# Patient Record
Sex: MALE | Race: WHITE | NOT HISPANIC OR LATINO | Employment: FULL TIME | URBAN - METROPOLITAN AREA
[De-identification: names, ages, dates, MRNs, and addresses within clinical notes are randomized per-mention and may not be internally consistent; named-entity substitution may affect disease eponyms.]

---

## 2021-04-07 ENCOUNTER — WORKER'S COMP (OUTPATIENT)
Dept: URGENT CARE | Age: 26
End: 2021-04-07

## 2021-04-07 VITALS
SYSTOLIC BLOOD PRESSURE: 110 MMHG | HEART RATE: 72 BPM | DIASTOLIC BLOOD PRESSURE: 78 MMHG | RESPIRATION RATE: 16 BRPM | TEMPERATURE: 98.2 F
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
## 2021-04-07 DIAGNOSIS — S61.215A LACERATION OF LEFT RING FINGER WITHOUT FOREIGN BODY WITHOUT DAMAGE TO NAIL, INITIAL ENCOUNTER: ICD-10-CM

## 2021-04-07 PROCEDURE — 12001 RPR S/N/AX/GEN/TRNK 2.5CM/<: CPT | Performed by: FAMILY MEDICINE

## 2021-04-07 PROCEDURE — 99213 OFFICE O/P EST LOW 20 MIN: CPT | Performed by: FAMILY MEDICINE

## 2021-04-07 PROCEDURE — 90715 TDAP VACCINE 7 YRS/> IM: CPT | Performed by: FAMILY MEDICINE

## 2021-04-07 PROCEDURE — 90471 IMMUNIZATION ADMIN: CPT | Performed by: FAMILY MEDICINE

## 2021-04-07 RX ORDER — LORAZEPAM 1 MG/1
TABLET ORAL
COMMUNITY
Start: 2021-02-03

## 2021-04-07 RX ORDER — ESCITALOPRAM OXALATE 10 MG/1
10 TABLET ORAL DAILY
COMMUNITY
Start: 2021-02-03

## 2021-04-09 ENCOUNTER — CASE MANAGEMENT (OUTPATIENT)
Dept: OCCUPATIONAL MEDICINE | Age: 26
End: 2021-04-09

## 2022-03-03 ENCOUNTER — TELEPHONE (OUTPATIENT)
Dept: SCHEDULING | Facility: IMAGING CENTER | Age: 27
End: 2022-03-03

## 2022-03-13 NOTE — PROGRESS NOTES
Subjective:     CC:  Diagnoses of Dizziness, Anxiety, Family history of diabetes mellitus (DM), Need for hepatitis C screening test, Screening for HIV (human immunodeficiency virus), and Encounter to establish care with new doctor were pertinent to this visit.    HISTORY OF THE PRESENT ILLNESS: Patient is a 26 y.o. male. This pleasant patient is here today to establish care and discuss chronic conditions. His prior PCP was none.    Problem   Dizziness    Chronic condition. He reports intermittent dizziness described as wobbling and shortness of breath usually when he is stressed. His symptoms are sometimes worsened by head movement. He drinks about 80cc a day. He wakes up slightly anxious. No known history of snoring. No recent blood work. Job is stressful.     Anxiety    Chronic condition. His main concern is that he sometimes slurs while talking when he is anxious and he is hoping to improve that so he can handle advancement in work positions. Has tried Zoloft and Lexapro in the past for 4-5 months and stopped due to feeling sleepy. His previous provider in [place] had him try lorazepam 0.5mg as needed which helped with sleep but he felt it was too strong. He knows he has a good life but will sometimes   Current regimen: none  He reports compliance and/or tolerance and symptoms controlled with current medication(s). Does not need medication(s) refill. No acute concerns.     Family History of Diabetes Mellitus (Dm)    He reports DM in maternal grandfather.         Current Outpatient Medications Ordered in Epic   Medication Sig Dispense Refill   • LORazepam (ATIVAN) 0.5 MG Tab Take 0.5 mg by mouth every four hours as needed for Anxiety.     • hydrOXYzine HCl (ATARAX) 25 MG Tab Take 1 Tablet by mouth 3 times a day as needed for Anxiety. 60 Tablet 3   • sertraline (ZOLOFT) 50 MG Tab Take 1 Tablet by mouth every morning. 30 Tablet 2   • scopolamine (TRANSDERM-SCOP, 1.5 MG,) 1 mg/72hr PATCH 72 HR Place 1 Patch on the  "skin every 72 hours. 4 Patch 3   • albuterol 108 (90 Base) MCG/ACT Aero Soln inhalation aerosol Inhale 1-2 Puffs every four hours as needed for Shortness of Breath. 1 Each 3     No current Epic-ordered facility-administered medications on file.     Social history  Living situation: lives by self at home, he was last in [place] and moved to [place] 07/2021, originally from [place]  Occupation: works as  at [employer]  Alcohol/tobacco/illicit drugs: never smoker, social EtOH, denies illicit drugs  Diet/Exercise: tries to eat healthy in general, regular exercise 4 times a week with weight lifting    Health Maintenance: reviewed and discussed with patient  Vaccines: due for flu, Tdap, HPV, Moderna COVID #2 received 08/2021    ROS:   Gen: no fevers/chills, no changes in weight  Eyes: no changes in vision  ENT: no sore throat  Pulm: + sob, no cough  CV: no chest pain, no palpitations  GI: no nausea/vomiting, no diarrhea  : no dysuria  MSk: no myalgias  Skin: no rash  Neuro: no headaches, + dizziness  Psych: + anxiety      Objective:     Exam: /68 (BP Location: Left arm, Patient Position: Sitting, BP Cuff Size: Adult)   Pulse 78   Temp 36.6 °C (97.8 °F) (Temporal)   Resp 14   Ht 1.753 m (5' 9\")   Wt 69.2 kg (152 lb 8.9 oz)   SpO2 96%  Body mass index is 22.53 kg/m².    General: Normal appearing. No distress.  HEENT: Normocephalic.  Neck: Supple without JVD or bruit. Thyroid is not enlarged.  Pulmonary: Clear to ausculation. Normal effort. No rales, ronchi, or wheezing.  Cardiovascular: Regular rate and rhythm without murmur.  Abdomen: Soft, nontender, nondistended. Normal bowel sounds.  Neurologic: Grossly nonfocal  Skin: Warm and dry. No obvious lesions.  Musculoskeletal: Normal gait. No extremity cyanosis, clubbing, or edema.  Psych: Normal mood and affect. Alert and oriented x3. Judgment and insight is normal.    Labs: No recent lab results available for review at this time    GAD7 score " 14    Assessment & Plan:   26 y.o. male with the following -    1. Dizziness  Chronic condition, persistent. Possibly BPPV vs anxiety related symptoms. Plan to trial scopolamine patch. Follow up blood work per orders.  - CBC WITH DIFFERENTIAL; Future  - Comp Metabolic Panel; Future  - scopolamine (TRANSDERM-SCOP, 1.5 MG,) 1 mg/72hr PATCH 72 HR; Place 1 Patch on the skin every 72 hours.  Dispense: 4 Patch; Refill: 3    2. Anxiety  Chronic condition, persistent. Patient would like to retry SSRI at this time.  - restart sertraline 50mg qAM, Rx hydroxyzine 25mg as needed for anxiety  - Referral to Psychology  - hydrOXYzine HCl (ATARAX) 25 MG Tab; Take 1 Tablet by mouth 3 times a day as needed for Anxiety.  Dispense: 60 Tablet; Refill: 3  - sertraline (ZOLOFT) 50 MG Tab; Take 1 Tablet by mouth every morning.  Dispense: 30 Tablet; Refill: 2  - TSH WITH REFLEX TO FT4; Future  - albuterol 108 (90 Base) MCG/ACT Aero Soln inhalation aerosol; Inhale 1-2 Puffs every four hours as needed for Shortness of Breath.  Dispense: 1 Each; Refill: 3    3. Family history of diabetes mellitus (DM)  - HEMOGLOBIN A1C; Future    4. Need for hepatitis C screening test  - HEP C VIRUS ANTIBODY; Future    5. Screening for HIV (human immunodeficiency virus)  - HIV AG/AB COMBO ASSAY SCREENING; Future    6. Encounter to establish care with new doctor        Return in about 6 weeks (around 4/29/2022) for f/u anxiety.    Please note that this dictation was created using voice recognition software. I have made every reasonable attempt to correct obvious errors, but I expect that there are errors of grammar and possibly content that I did not discover before finalizing the note.

## 2022-03-18 ENCOUNTER — HOSPITAL ENCOUNTER (OUTPATIENT)
Dept: LAB | Facility: MEDICAL CENTER | Age: 27
End: 2022-03-18
Attending: STUDENT IN AN ORGANIZED HEALTH CARE EDUCATION/TRAINING PROGRAM
Payer: COMMERCIAL

## 2022-03-18 ENCOUNTER — OFFICE VISIT (OUTPATIENT)
Dept: MEDICAL GROUP | Facility: MEDICAL CENTER | Age: 27
End: 2022-03-18
Payer: COMMERCIAL

## 2022-03-18 VITALS
BODY MASS INDEX: 22.6 KG/M2 | SYSTOLIC BLOOD PRESSURE: 114 MMHG | TEMPERATURE: 97.8 F | DIASTOLIC BLOOD PRESSURE: 68 MMHG | OXYGEN SATURATION: 96 % | HEIGHT: 69 IN | HEART RATE: 78 BPM | WEIGHT: 152.56 LBS | RESPIRATION RATE: 14 BRPM

## 2022-03-18 DIAGNOSIS — Z11.59 NEED FOR HEPATITIS C SCREENING TEST: ICD-10-CM

## 2022-03-18 DIAGNOSIS — Z11.4 SCREENING FOR HIV (HUMAN IMMUNODEFICIENCY VIRUS): ICD-10-CM

## 2022-03-18 DIAGNOSIS — Z83.3 FAMILY HISTORY OF DIABETES MELLITUS (DM): ICD-10-CM

## 2022-03-18 DIAGNOSIS — R42 DIZZINESS: ICD-10-CM

## 2022-03-18 DIAGNOSIS — F41.9 ANXIETY: ICD-10-CM

## 2022-03-18 DIAGNOSIS — Z76.89 ENCOUNTER TO ESTABLISH CARE WITH NEW DOCTOR: ICD-10-CM

## 2022-03-18 LAB
ALBUMIN SERPL BCP-MCNC: 4.8 G/DL (ref 3.2–4.9)
ALBUMIN/GLOB SERPL: 1.9 G/DL
ALP SERPL-CCNC: 57 U/L (ref 30–99)
ALT SERPL-CCNC: 23 U/L (ref 2–50)
ANION GAP SERPL CALC-SCNC: 10 MMOL/L (ref 7–16)
AST SERPL-CCNC: 21 U/L (ref 12–45)
BASOPHILS # BLD AUTO: 0.9 % (ref 0–1.8)
BASOPHILS # BLD: 0.04 K/UL (ref 0–0.12)
BILIRUB SERPL-MCNC: 0.7 MG/DL (ref 0.1–1.5)
BUN SERPL-MCNC: 14 MG/DL (ref 8–22)
CALCIUM SERPL-MCNC: 9.6 MG/DL (ref 8.4–10.2)
CHLORIDE SERPL-SCNC: 105 MMOL/L (ref 96–112)
CO2 SERPL-SCNC: 25 MMOL/L (ref 20–33)
CREAT SERPL-MCNC: 0.89 MG/DL (ref 0.5–1.4)
EOSINOPHIL # BLD AUTO: 0.08 K/UL (ref 0–0.51)
EOSINOPHIL NFR BLD: 1.8 % (ref 0–6.9)
ERYTHROCYTE [DISTWIDTH] IN BLOOD BY AUTOMATED COUNT: 39.6 FL (ref 35.9–50)
FASTING STATUS PATIENT QL REPORTED: NORMAL
GFR SERPLBLD CREATININE-BSD FMLA CKD-EPI: 121 ML/MIN/1.73 M 2
GLOBULIN SER CALC-MCNC: 2.5 G/DL (ref 1.9–3.5)
GLUCOSE SERPL-MCNC: 86 MG/DL (ref 65–99)
HCT VFR BLD AUTO: 48.7 % (ref 42–52)
HGB BLD-MCNC: 16.2 G/DL (ref 14–18)
IMM GRANULOCYTES # BLD AUTO: 0.01 K/UL (ref 0–0.11)
IMM GRANULOCYTES NFR BLD AUTO: 0.2 % (ref 0–0.9)
LYMPHOCYTES # BLD AUTO: 1.59 K/UL (ref 1–4.8)
LYMPHOCYTES NFR BLD: 35.2 % (ref 22–41)
MCH RBC QN AUTO: 29.7 PG (ref 27–33)
MCHC RBC AUTO-ENTMCNC: 33.3 G/DL (ref 33.7–35.3)
MCV RBC AUTO: 89.4 FL (ref 81.4–97.8)
MONOCYTES # BLD AUTO: 0.44 K/UL (ref 0–0.85)
MONOCYTES NFR BLD AUTO: 9.7 % (ref 0–13.4)
NEUTROPHILS # BLD AUTO: 2.36 K/UL (ref 1.82–7.42)
NEUTROPHILS NFR BLD: 52.2 % (ref 44–72)
NRBC # BLD AUTO: 0 K/UL
NRBC BLD-RTO: 0 /100 WBC
PLATELET # BLD AUTO: 147 K/UL (ref 164–446)
PMV BLD AUTO: 11.7 FL (ref 9–12.9)
POTASSIUM SERPL-SCNC: 4 MMOL/L (ref 3.6–5.5)
PROT SERPL-MCNC: 7.3 G/DL (ref 6–8.2)
RBC # BLD AUTO: 5.45 M/UL (ref 4.7–6.1)
SODIUM SERPL-SCNC: 140 MMOL/L (ref 135–145)
TSH SERPL DL<=0.005 MIU/L-ACNC: 2.4 UIU/ML (ref 0.38–5.33)
WBC # BLD AUTO: 4.5 K/UL (ref 4.8–10.8)

## 2022-03-18 PROCEDURE — 83036 HEMOGLOBIN GLYCOSYLATED A1C: CPT

## 2022-03-18 PROCEDURE — 99203 OFFICE O/P NEW LOW 30 MIN: CPT | Performed by: STUDENT IN AN ORGANIZED HEALTH CARE EDUCATION/TRAINING PROGRAM

## 2022-03-18 PROCEDURE — 85025 COMPLETE CBC W/AUTO DIFF WBC: CPT

## 2022-03-18 PROCEDURE — 87389 HIV-1 AG W/HIV-1&-2 AB AG IA: CPT

## 2022-03-18 PROCEDURE — 36415 COLL VENOUS BLD VENIPUNCTURE: CPT

## 2022-03-18 PROCEDURE — 84443 ASSAY THYROID STIM HORMONE: CPT

## 2022-03-18 PROCEDURE — 86803 HEPATITIS C AB TEST: CPT

## 2022-03-18 PROCEDURE — 80053 COMPREHEN METABOLIC PANEL: CPT

## 2022-03-18 RX ORDER — LORAZEPAM 0.5 MG/1
0.5 TABLET ORAL EVERY 4 HOURS PRN
COMMUNITY
End: 2022-04-29

## 2022-03-18 RX ORDER — ALBUTEROL SULFATE 90 UG/1
1-2 AEROSOL, METERED RESPIRATORY (INHALATION) EVERY 4 HOURS PRN
Qty: 1 EACH | Refills: 3 | Status: SHIPPED | OUTPATIENT
Start: 2022-03-18 | End: 2023-03-14

## 2022-03-18 RX ORDER — SCOLOPAMINE TRANSDERMAL SYSTEM 1 MG/1
1 PATCH, EXTENDED RELEASE TRANSDERMAL
Qty: 4 PATCH | Refills: 3 | Status: SHIPPED | OUTPATIENT
Start: 2022-03-18 | End: 2022-04-29 | Stop reason: SDUPTHER

## 2022-03-18 RX ORDER — HYDROXYZINE HYDROCHLORIDE 25 MG/1
25 TABLET, FILM COATED ORAL 3 TIMES DAILY PRN
Qty: 60 TABLET | Refills: 3 | Status: SHIPPED | OUTPATIENT
Start: 2022-03-18 | End: 2022-05-22 | Stop reason: SDUPTHER

## 2022-03-18 ASSESSMENT — ANXIETY QUESTIONNAIRES
IF YOU CHECKED OFF ANY PROBLEMS ON THIS QUESTIONNAIRE, HOW DIFFICULT HAVE THESE PROBLEMS MADE IT FOR YOU TO DO YOUR WORK, TAKE CARE OF THINGS AT HOME, OR GET ALONG WITH OTHER PEOPLE: SOMEWHAT DIFFICULT
2. NOT BEING ABLE TO STOP OR CONTROL WORRYING: MORE THAN HALF THE DAYS
6. BECOMING EASILY ANNOYED OR IRRITABLE: NOT AT ALL
5. BEING SO RESTLESS THAT IT IS HARD TO SIT STILL: MORE THAN HALF THE DAYS
GAD7 TOTAL SCORE: 14
1. FEELING NERVOUS, ANXIOUS, OR ON EDGE: NEARLY EVERY DAY
4. TROUBLE RELAXING: MORE THAN HALF THE DAYS
7. FEELING AFRAID AS IF SOMETHING AWFUL MIGHT HAPPEN: NEARLY EVERY DAY
3. WORRYING TOO MUCH ABOUT DIFFERENT THINGS: MORE THAN HALF THE DAYS

## 2022-03-18 ASSESSMENT — PATIENT HEALTH QUESTIONNAIRE - PHQ9: CLINICAL INTERPRETATION OF PHQ2 SCORE: 0

## 2022-03-19 LAB
EST. AVERAGE GLUCOSE BLD GHB EST-MCNC: 103 MG/DL
HBA1C MFR BLD: 5.2 % (ref 4–5.6)
HCV AB SER QL: NORMAL
HIV 1+2 AB+HIV1 P24 AG SERPL QL IA: NORMAL

## 2022-04-29 ENCOUNTER — OFFICE VISIT (OUTPATIENT)
Dept: MEDICAL GROUP | Facility: MEDICAL CENTER | Age: 27
End: 2022-04-29
Payer: COMMERCIAL

## 2022-04-29 ENCOUNTER — HOSPITAL ENCOUNTER (OUTPATIENT)
Facility: MEDICAL CENTER | Age: 27
End: 2022-04-29
Attending: STUDENT IN AN ORGANIZED HEALTH CARE EDUCATION/TRAINING PROGRAM
Payer: COMMERCIAL

## 2022-04-29 VITALS
WEIGHT: 151.46 LBS | TEMPERATURE: 98.6 F | SYSTOLIC BLOOD PRESSURE: 118 MMHG | RESPIRATION RATE: 16 BRPM | HEART RATE: 81 BPM | DIASTOLIC BLOOD PRESSURE: 60 MMHG | OXYGEN SATURATION: 94 % | HEIGHT: 69 IN | BODY MASS INDEX: 22.43 KG/M2

## 2022-04-29 DIAGNOSIS — R42 DIZZINESS: ICD-10-CM

## 2022-04-29 DIAGNOSIS — F90.0 ATTENTION DEFICIT HYPERACTIVITY DISORDER (ADHD), PREDOMINANTLY INATTENTIVE TYPE: ICD-10-CM

## 2022-04-29 DIAGNOSIS — F41.9 ANXIETY: ICD-10-CM

## 2022-04-29 PROBLEM — F90.9 ADHD: Status: ACTIVE | Noted: 2022-04-29

## 2022-04-29 PROCEDURE — 99214 OFFICE O/P EST MOD 30 MIN: CPT | Performed by: STUDENT IN AN ORGANIZED HEALTH CARE EDUCATION/TRAINING PROGRAM

## 2022-04-29 PROCEDURE — G0481 DRUG TEST DEF 8-14 CLASSES: HCPCS

## 2022-04-29 RX ORDER — SCOLOPAMINE TRANSDERMAL SYSTEM 1 MG/1
1 PATCH, EXTENDED RELEASE TRANSDERMAL
Qty: 4 PATCH | Refills: 3 | Status: SHIPPED | OUTPATIENT
Start: 2022-04-29 | End: 2022-07-29

## 2022-04-29 RX ORDER — DEXTROAMPHETAMINE SACCHARATE, AMPHETAMINE ASPARTATE, DEXTROAMPHETAMINE SULFATE AND AMPHETAMINE SULFATE 1.25; 1.25; 1.25; 1.25 MG/1; MG/1; MG/1; MG/1
5 TABLET ORAL EVERY MORNING
Qty: 30 TABLET | Refills: 0 | Status: SHIPPED | OUTPATIENT
Start: 2022-05-30 | End: 2022-06-29

## 2022-04-29 RX ORDER — DEXTROAMPHETAMINE SACCHARATE, AMPHETAMINE ASPARTATE, DEXTROAMPHETAMINE SULFATE AND AMPHETAMINE SULFATE 1.25; 1.25; 1.25; 1.25 MG/1; MG/1; MG/1; MG/1
5 TABLET ORAL EVERY MORNING
Qty: 30 TABLET | Refills: 0 | Status: SHIPPED | OUTPATIENT
Start: 2022-04-29 | End: 2022-05-29

## 2022-04-29 RX ORDER — DEXTROAMPHETAMINE SACCHARATE, AMPHETAMINE ASPARTATE, DEXTROAMPHETAMINE SULFATE AND AMPHETAMINE SULFATE 1.25; 1.25; 1.25; 1.25 MG/1; MG/1; MG/1; MG/1
5 TABLET ORAL EVERY MORNING
Qty: 30 TABLET | Refills: 0 | Status: SHIPPED | OUTPATIENT
Start: 2022-06-30 | End: 2022-07-29 | Stop reason: SDUPTHER

## 2022-04-29 ASSESSMENT — FIBROSIS 4 INDEX: FIB4 SCORE: 0.74

## 2022-04-30 DIAGNOSIS — F90.0 ATTENTION DEFICIT HYPERACTIVITY DISORDER (ADHD), PREDOMINANTLY INATTENTIVE TYPE: ICD-10-CM

## 2022-05-03 LAB
1OH-MIDAZOLAM UR QL SCN: NOT DETECTED
6MAM UR QL: NOT DETECTED
7AMINOCLONAZEPAM UR QL: NOT DETECTED
A-OH ALPRAZ UR QL: NOT DETECTED
ALPRAZ UR QL: NOT DETECTED
AMPHET UR QL SCN: NOT DETECTED
ANNOTATION COMMENT IMP: NORMAL
ANNOTATION COMMENT IMP: NORMAL
BARBITURATES UR QL: NOT DETECTED
BUPRENORPHINE UR QL: NOT DETECTED
BZE UR QL: NOT DETECTED
CARBOXYTHC UR QL: PRESENT
CARISOPRODOL UR QL: NOT DETECTED
CLONAZEPAM UR QL: NOT DETECTED
CODEINE UR QL: NOT DETECTED
DIAZEPAM UR QL: NOT DETECTED
ETHYL GLUCURONIDE UR QL: NOT DETECTED
FENTANYL UR QL: NOT DETECTED
GABAPENTIN UR QL: NOT DETECTED
HYDROCODONE UR QL: NOT DETECTED
HYDROMORPHONE UR QL: NOT DETECTED
LORAZEPAM UR QL: NOT DETECTED
MDA UR QL: NOT DETECTED
MDEA UR QL: NOT DETECTED
MDMA UR QL: NOT DETECTED
MEPERIDINE UR QL: NOT DETECTED
METHADONE UR QL: NOT DETECTED
METHAMPHET UR QL: NOT DETECTED
MIDAZOLAM UR QL SCN: NOT DETECTED
MORPHINE UR QL: NOT DETECTED
NALOXONE UR QL SCN: NOT DETECTED
NORBUPRENORPHINE UR QL CFM: NOT DETECTED
NORDIAZEPAM UR QL: NOT DETECTED
NORFENTANYL UR QL: NOT DETECTED
NORHYDROCODONE UR QL CFM: NOT DETECTED
NOROXYCODONE UR QL CFM: NOT DETECTED
NOROXYMORPH CO100 Q0458: NOT DETECTED
OXAZEPAM UR QL: NOT DETECTED
OXYCODONE UR QL: NOT DETECTED
OXYMORPHONE UR QL: NOT DETECTED
PATHOLOGY STUDY: NORMAL
PCP UR QL: NOT DETECTED
PHENTERMINE UR QL: NOT DETECTED
PPAA UR QL: NOT DETECTED
PREGABALIN UR QL SCN: NOT DETECTED
SERVICE CMNT-IMP: NORMAL
TAPENADOL OSULF CO200 Q0473: NOT DETECTED
TAPENTADOL UR QL SCN: NOT DETECTED
TEMAZEPAM UR QL: NOT DETECTED
TRAMADOL UR QL: NOT DETECTED
ZOLPIDEM PHENYL-4-CARB UR QL SCN: NOT DETECTED
ZOLPIDEM UR QL: NOT DETECTED

## 2022-05-22 DIAGNOSIS — F41.9 ANXIETY: ICD-10-CM

## 2022-05-23 RX ORDER — HYDROXYZINE HYDROCHLORIDE 25 MG/1
25 TABLET, FILM COATED ORAL 3 TIMES DAILY PRN
Qty: 60 TABLET | Refills: 3 | Status: SHIPPED | OUTPATIENT
Start: 2022-05-23 | End: 2022-07-29 | Stop reason: SDUPTHER

## 2022-07-29 ENCOUNTER — OFFICE VISIT (OUTPATIENT)
Dept: MEDICAL GROUP | Facility: MEDICAL CENTER | Age: 27
End: 2022-07-29
Payer: COMMERCIAL

## 2022-07-29 VITALS
OXYGEN SATURATION: 95 % | DIASTOLIC BLOOD PRESSURE: 62 MMHG | RESPIRATION RATE: 16 BRPM | HEIGHT: 69 IN | SYSTOLIC BLOOD PRESSURE: 118 MMHG | HEART RATE: 89 BPM | WEIGHT: 156 LBS | TEMPERATURE: 98.2 F | BODY MASS INDEX: 23.11 KG/M2

## 2022-07-29 DIAGNOSIS — R42 DIZZINESS: ICD-10-CM

## 2022-07-29 DIAGNOSIS — F90.0 ATTENTION DEFICIT HYPERACTIVITY DISORDER (ADHD), PREDOMINANTLY INATTENTIVE TYPE: ICD-10-CM

## 2022-07-29 DIAGNOSIS — F41.9 ANXIETY: ICD-10-CM

## 2022-07-29 PROCEDURE — 99214 OFFICE O/P EST MOD 30 MIN: CPT | Performed by: STUDENT IN AN ORGANIZED HEALTH CARE EDUCATION/TRAINING PROGRAM

## 2022-07-29 RX ORDER — DEXTROAMPHETAMINE SACCHARATE, AMPHETAMINE ASPARTATE, DEXTROAMPHETAMINE SULFATE AND AMPHETAMINE SULFATE 1.25; 1.25; 1.25; 1.25 MG/1; MG/1; MG/1; MG/1
5 TABLET ORAL
Qty: 12 TABLET | Refills: 0 | Status: SHIPPED | OUTPATIENT
Start: 2022-09-29 | End: 2022-10-04

## 2022-07-29 RX ORDER — HYDROXYZINE 50 MG/1
50 TABLET, FILM COATED ORAL NIGHTLY
Qty: 90 TABLET | Refills: 3 | Status: SHIPPED | OUTPATIENT
Start: 2022-07-29 | End: 2023-03-14

## 2022-07-29 RX ORDER — DEXTROAMPHETAMINE SACCHARATE, AMPHETAMINE ASPARTATE, DEXTROAMPHETAMINE SULFATE AND AMPHETAMINE SULFATE 1.25; 1.25; 1.25; 1.25 MG/1; MG/1; MG/1; MG/1
5 TABLET ORAL
Qty: 12 TABLET | Refills: 0 | Status: SHIPPED | OUTPATIENT
Start: 2022-07-29 | End: 2022-09-04 | Stop reason: SDUPTHER

## 2022-07-29 RX ORDER — DEXTROAMPHETAMINE SACCHARATE, AMPHETAMINE ASPARTATE, DEXTROAMPHETAMINE SULFATE AND AMPHETAMINE SULFATE 1.25; 1.25; 1.25; 1.25 MG/1; MG/1; MG/1; MG/1
5 TABLET ORAL
Qty: 12 TABLET | Refills: 0 | Status: SHIPPED | OUTPATIENT
Start: 2022-08-29 | End: 2022-09-28

## 2022-07-29 ASSESSMENT — ANXIETY QUESTIONNAIRES
6. BECOMING EASILY ANNOYED OR IRRITABLE: SEVERAL DAYS
4. TROUBLE RELAXING: SEVERAL DAYS
IF YOU CHECKED OFF ANY PROBLEMS ON THIS QUESTIONNAIRE, HOW DIFFICULT HAVE THESE PROBLEMS MADE IT FOR YOU TO DO YOUR WORK, TAKE CARE OF THINGS AT HOME, OR GET ALONG WITH OTHER PEOPLE: SOMEWHAT DIFFICULT
2. NOT BEING ABLE TO STOP OR CONTROL WORRYING: SEVERAL DAYS
5. BEING SO RESTLESS THAT IT IS HARD TO SIT STILL: SEVERAL DAYS
1. FEELING NERVOUS, ANXIOUS, OR ON EDGE: SEVERAL DAYS
GAD7 TOTAL SCORE: 7
3. WORRYING TOO MUCH ABOUT DIFFERENT THINGS: SEVERAL DAYS
7. FEELING AFRAID AS IF SOMETHING AWFUL MIGHT HAPPEN: SEVERAL DAYS

## 2022-07-29 ASSESSMENT — FIBROSIS 4 INDEX: FIB4 SCORE: 0.77

## 2022-07-29 NOTE — PROGRESS NOTES
Subjective:     CC: follow up    HPI:   Kenzie presents today for follow up    Problem   Adhd    Chronic condition since high school. Since last visit, we started Adderall. He has been taking it as needed due to it hurting his stomach. He has been taking Adderall about 2-3 times a week.    He used to take Adderall (also tried Strattera in the past) but discontinued due to GI discomfort. The last time he took medications for ADHD was 1 year ago.    He has been having increased inattention due to work. He would like to restart Adderall 5mg qAM.     Dizziness    Chronic condition. Last visit, we tried scopolamine patch. He reports improvement in symptoms with scopolamine patches. He has not been needing to use the patches recently as his anxiety improved.    He reports intermittent dizziness described as wobbling and shortness of breath usually when he is stressed. His symptoms are sometimes worsened by head movement. He drinks about 80cc a day. He wakes up slightly anxious. No known history of snoring. No recent blood work. Job is stressful.     Anxiety    Chronic condition. Last visit, we restarted sertraline 50mg qAM and hydroxyzine 50mg nightly for sleep. He reports 70% improvement in his mood and would like to stay on current dose for sertraline. He does take hydroxyzine nightly as needed which helps with his sleep.    His main concern is that he sometimes slurs while talking when he is anxious and he is hoping to improve that so he can handle advancement in work positions. Has tried Zoloft and Lexapro in the past for 4-5 months and stopped due to feeling sleepy. His previous provider in [place] had him try lorazepam 0.5mg as needed which helped with sleep but he felt it was too strong. He knows he has a good life but will sometimes   Current regimen: none  He reports compliance and/or tolerance and symptoms controlled with current medication(s). Does not need medication(s) refill. No acute concerns.    "      Current Outpatient Medications Ordered in Epic   Medication Sig Dispense Refill   • hydrOXYzine HCl (ATARAX) 50 MG Tab Take 1 Tablet by mouth every evening. 90 Tablet 3   • sertraline (ZOLOFT) 50 MG Tab Take 1 Tablet by mouth every morning. 90 Tablet 3   • amphetamine-dextroamphetamine (ADDERALL, 5MG,) 5 MG Tab Take 1 Tablet by mouth every Monday, Wednesday, and Friday for 30 days. 12 Tablet 0   • [START ON 8/29/2022] amphetamine-dextroamphetamine (ADDERALL, 5MG,) 5 MG Tab Take 1 Tablet by mouth every Monday, Wednesday, and Friday for 30 days. 12 Tablet 0   • [START ON 9/29/2022] amphetamine-dextroamphetamine (ADDERALL, 5MG,) 5 MG Tab Take 1 Tablet by mouth every Monday, Wednesday, and Friday for 30 days. 12 Tablet 0   • albuterol 108 (90 Base) MCG/ACT Aero Soln inhalation aerosol Inhale 1-2 Puffs every four hours as needed for Shortness of Breath. 1 Each 3     No current Epic-ordered facility-administered medications on file.     Social history  Living situation: lives by self at home, he was last in [place] and moved to [place] 07/2021, originally from [place]  Occupation: works as  at [employer]  Alcohol/tobacco/illicit drugs: never smoker, social EtOH, denies illicit drugs  Diet/Exercise: tries to eat healthy in general, regular exercise 4 times a week with weight lifting     Health Maintenance: reviewed and discussed with patient  Vaccines: Tdap (received 04/2021 in [place]), HPV received in [place], Moderna COVID #2 received 08/2021 (no plans for booster)     ROS:   Gen: no fevers/chills, no changes in weight  Pulm: no sob, no cough  CV: no chest pain, no palpitations  GI: no nausea/vomiting, no diarrhea  Neuro: no headaches  Psych: + anxiety, ADHD    Objective:     Exam:  /62 (BP Location: Left arm, Patient Position: Sitting, BP Cuff Size: Adult)   Pulse 89   Temp 36.8 °C (98.2 °F) (Temporal)   Resp 16   Ht 1.753 m (5' 9\")   Wt 70.8 kg (156 lb)   SpO2 95%   BMI 23.04 kg/m²  " Body mass index is 23.04 kg/m².    General: Normal appearing. No distress.  Pulmonary: Clear to ausculation. Normal effort. No rales, ronchi, or wheezing.  Cardiovascular: Regular rate and rhythm without murmur.  Abdomen: Soft, nontender, nondistended. Normal bowel sounds.  Musculoskeletal: Normal gait. No extremity cyanosis, clubbing, or edema.  Psych: Normal mood and affect. Alert and oriented x3. Judgment and insight is normal.    Labs: no new lab results since last visit    Assessment & Plan:     27 y.o. male with the following -     1. Attention deficit hyperactivity disorder (ADHD), predominantly inattentive type  Chronic condition, stable. He would eventually like to consider trying Vyvanse. Refer to psychiatry for consultation.  - change to Adderall 5mg 2-3 times per week, PDMP reviewed and appropriate, Rx refilled for 90 days  - Referral to Psychiatry  - amphetamine-dextroamphetamine (ADDERALL, 5MG,) 5 MG Tab; Take 1 Tablet by mouth every Monday, Wednesday, and Friday for 30 days.  Dispense: 12 Tablet; Refill: 0  - amphetamine-dextroamphetamine (ADDERALL, 5MG,) 5 MG Tab; Take 1 Tablet by mouth every Monday, Wednesday, and Friday for 30 days.  Dispense: 12 Tablet; Refill: 0  - amphetamine-dextroamphetamine (ADDERALL, 5MG,) 5 MG Tab; Take 1 Tablet by mouth every Monday, Wednesday, and Friday for 30 days.  Dispense: 12 Tablet; Refill: 0    2. Anxiety  Chronic condition, stable.  - cont current regimen: sertraline 50mg daily and hydroxyzine 50mg qhs  - hydrOXYzine HCl (ATARAX) 50 MG Tab; Take 1 Tablet by mouth every evening.  Dispense: 90 Tablet; Refill: 3  - sertraline (ZOLOFT) 50 MG Tab; Take 1 Tablet by mouth every morning.  Dispense: 90 Tablet; Refill: 3    3. Dizziness  Chronic condition, resolved with improvement of anxiety.    Return in about 3 months (around 10/29/2022) for chronic medical conditions.    Please note that this dictation was created using voice recognition software. I have made every  reasonable attempt to correct obvious errors, but I expect that there are errors of grammar and possibly content that I did not discover before finalizing the note.

## 2022-08-31 ENCOUNTER — OFFICE VISIT (OUTPATIENT)
Dept: MEDICAL GROUP | Facility: MEDICAL CENTER | Age: 27
End: 2022-08-31
Payer: COMMERCIAL

## 2022-08-31 VITALS
TEMPERATURE: 98.5 F | WEIGHT: 156.53 LBS | SYSTOLIC BLOOD PRESSURE: 114 MMHG | RESPIRATION RATE: 16 BRPM | HEIGHT: 69 IN | BODY MASS INDEX: 23.18 KG/M2 | OXYGEN SATURATION: 95 % | DIASTOLIC BLOOD PRESSURE: 68 MMHG | HEART RATE: 82 BPM

## 2022-08-31 DIAGNOSIS — L64.9 MALE PATTERN ALOPECIA: ICD-10-CM

## 2022-08-31 PROCEDURE — 99213 OFFICE O/P EST LOW 20 MIN: CPT | Performed by: STUDENT IN AN ORGANIZED HEALTH CARE EDUCATION/TRAINING PROGRAM

## 2022-08-31 RX ORDER — MINOXIDIL 2.5 MG/1
1.25 TABLET ORAL DAILY
Qty: 45 TABLET | Refills: 3 | Status: SHIPPED | OUTPATIENT
Start: 2022-08-31 | End: 2022-11-29

## 2022-08-31 ASSESSMENT — FIBROSIS 4 INDEX: FIB4 SCORE: 0.77

## 2022-08-31 NOTE — PROGRESS NOTES
Subjective:     CC: medication refill    HPI:   Kenzie presents today for medication refill    Problem   Male Pattern Alopecia    Chronic condition. Previously tried topical minoxidil with poor results. Has been doing well with oral minoxidil for about a year.  Current regimen: minoxidil 1.25mg daily  He reports compliance and/or tolerance and symptoms controlled with current medication(s). Does need medication(s) refill. No acute concerns.         Current Outpatient Medications Ordered in Epic   Medication Sig Dispense Refill    minoxidil (LONITEN) 2.5 MG Tab Take 0.5 Tablets by mouth every day for 90 days. 45 Tablet 3    hydrOXYzine HCl (ATARAX) 50 MG Tab Take 1 Tablet by mouth every evening. 90 Tablet 3    sertraline (ZOLOFT) 50 MG Tab Take 1 Tablet by mouth every morning. 90 Tablet 3    amphetamine-dextroamphetamine (ADDERALL, 5MG,) 5 MG Tab Take 1 Tablet by mouth every Monday, Wednesday, and Friday for 30 days. 12 Tablet 0    [START ON 9/29/2022] amphetamine-dextroamphetamine (ADDERALL, 5MG,) 5 MG Tab Take 1 Tablet by mouth every Monday, Wednesday, and Friday for 30 days. 12 Tablet 0    albuterol 108 (90 Base) MCG/ACT Aero Soln inhalation aerosol Inhale 1-2 Puffs every four hours as needed for Shortness of Breath. 1 Each 3     No current Epic-ordered facility-administered medications on file.     Social history  Living situation: lives by self at home, he was last in [place] and moved to [place] 07/2021, originally from [place]  Occupation: works as  at [employer]  Alcohol/tobacco/illicit drugs: never smoker, social EtOH, denies illicit drugs  Diet/Exercise: tries to eat healthy in general, regular exercise 4 times a week with weight lifting     Health Maintenance: reviewed and discussed with patient  Vaccines: Tdap (received 04/2021 in [place]), HPV received in [place], Moderna COVID #2 received 08/2021 (no plans for booster)     ROS:   Gen: no fevers/chills, no changes in weight  Pulm: no sob,  "no cough  CV: no chest pain, no palpitations  GI: no nausea/vomiting, no diarrhea  Neuro: no headaches  Psych: + anxiety, ADHD  Skin: + chronic alopecia    Objective:     Exam:  /68 (BP Location: Left arm, Patient Position: Sitting, BP Cuff Size: Adult)   Pulse 82   Temp 36.9 °C (98.5 °F) (Temporal)   Resp 16   Ht 1.753 m (5' 9\")   Wt 71 kg (156 lb 8.4 oz)   SpO2 95%   BMI 23.11 kg/m²  Body mass index is 23.11 kg/m².    General: Normal appearing. No distress.  Pulmonary: Clear to ausculation. Normal effort. No rales, ronchi, or wheezing.  Cardiovascular: Regular rate and rhythm without murmur.  Abdomen: Soft, nontender, nondistended. Normal bowel sounds.  Musculoskeletal: Normal gait. No extremity cyanosis, clubbing, or edema.  Psych: Normal mood and affect. Alert and oriented x3. Judgment and insight is normal.  Skin: androgenic alopecia on scalp noted    Assessment & Plan:     27 y.o. male with the following -     1. Male pattern alopecia  Chronic condition, stable. Previously tried topical minoxidil without good results. He has been satisfied with results from oral minoxidil. We discussed that oral minoxidil is not FDA approved specifically for androgenic alopecia therefore long term side effects are not well studies. Potential adverse reactions from oral minoxidil such as low blood pressure, increased heart rate, ankle swelling were discussed today. Patient acknowledged understanding and would like to continue with current regimen.  - cont current regimen: minoxidil 1.25mg daily  - minoxidil (LONITEN) 2.5 MG Tab; Take 0.5 Tablets by mouth every day for 90 days.  Dispense: 45 Tablet; Refill: 3    Return if symptoms worsen or fail to improve.    Please note that this dictation was created using voice recognition software. I have made every reasonable attempt to correct obvious errors, but I expect that there are errors of grammar and possibly content that I did not discover before finalizing the " note.

## 2022-09-04 DIAGNOSIS — F90.0 ATTENTION DEFICIT HYPERACTIVITY DISORDER (ADHD), PREDOMINANTLY INATTENTIVE TYPE: ICD-10-CM

## 2022-09-06 RX ORDER — DEXTROAMPHETAMINE SACCHARATE, AMPHETAMINE ASPARTATE, DEXTROAMPHETAMINE SULFATE AND AMPHETAMINE SULFATE 1.25; 1.25; 1.25; 1.25 MG/1; MG/1; MG/1; MG/1
5 TABLET ORAL
Qty: 12 TABLET | Refills: 0 | Status: SHIPPED | OUTPATIENT
Start: 2022-09-07 | End: 2022-10-04

## 2022-10-04 ENCOUNTER — OFFICE VISIT (OUTPATIENT)
Dept: BEHAVIORAL HEALTH | Facility: CLINIC | Age: 27
End: 2022-10-04
Payer: COMMERCIAL

## 2022-10-04 DIAGNOSIS — F33.1 MAJOR DEPRESSIVE DISORDER, RECURRENT EPISODE, MODERATE (HCC): ICD-10-CM

## 2022-10-04 DIAGNOSIS — F90.0 ATTENTION DEFICIT HYPERACTIVITY DISORDER, INATTENTIVE TYPE: ICD-10-CM

## 2022-10-04 DIAGNOSIS — F41.1 GENERALIZED ANXIETY DISORDER: ICD-10-CM

## 2022-10-04 PROCEDURE — 90792 PSYCH DIAG EVAL W/MED SRVCS: CPT | Performed by: PSYCHIATRY & NEUROLOGY

## 2022-10-04 RX ORDER — METHYLPHENIDATE HYDROCHLORIDE 5 MG/1
5 TABLET ORAL 2 TIMES DAILY
Qty: 60 TABLET | Refills: 0 | Status: SHIPPED | OUTPATIENT
Start: 2022-10-04 | End: 2022-11-03

## 2022-10-04 NOTE — PROGRESS NOTES
[facility]   Therapy Progress Note      This provider informed the patient their medical records are totally confidential except for the use by other providers involved in their care, or if the patient signs a release, or to report instances of child or elder abuse, or if it is determined they are an immediate risk to harm themselves or others.    Name: Kenzie Rodriguez  MRN: [id number]  : 1995  Age: 27 y.o.  Date of assessment: 10/4/2022  PCP: Jose Maria Palm D.O.      Present Illness:   Chart reviewed prior to seeing him in my office.  He is 27, single, never , and has no children.  He is a college graduate who is currently working as an  at [employer].  He is referred for evaluation of depression, anxiety, and ADHD.  ADHD is his primary concern.  He is restless, distractible, and impulsive at times.  He has trouble completing projects and some difficulty focusing on movies but much more difficulty staying focused on books.  It is affecting his work and the presentations that he needs to make.  He would like to try something other than Adderall for ADHD.    Past Psych History: No history of psychiatric hospitalizations or suicidal attempts.      Substance Abuse History:  No alcohol or substance abuse problems    Family History:   He has a 28-year-old sister.  There is no family history of psychiatric problems.    Medical History:  Alopecia.  His problems with anxiety and dizzyness are secondary to social situations.    Psych Medications:  Zoloft 50 mg a.m. taken since  is helping his depression and anxiety.  Adderall 5 mg taken on  and Friday are causing intestinal problems.  He previously tried 0.5 mg of lorazepam for anxiety.  He also was previously treated with Strattera and Lexapro but not Ritalin.    Allergies:   Tanning oil    Mental Status:   He is alert, oriented, and cooperative.  Relatedness is good.  Grooming is good.  Speech is normal rate.   Anxious.  Memory is good.  Insight and judgment are good.  No indication of psychotic thinking.    Current Risk:       Suicidal: Not suicidal    homicidal: Not homicidal       Self-Harm: No plan to harm self       Relapse (Low/Moderate/High): Moderate       Crisis Safety Plan Reviewed: Discussed with patient    Diagnosis:  ADHD  Major depressive disorder, recurrent  Generalized anxiety disorder    Treatment Plan:  The current treatment plan consists of a follow-up visit in 3 weeks and then monthly psychiatric sessions designed to evaluate his ADHD, depression, and anxiety.    Duration cannot be determined at this time.    Goals: Improvement in ADHD symptoms with remission of depression and control of anxiety in order to prevent relapse due to the chronic nature of his behavioral health problems and mental illness.  Continue Zoloft 50 mg a.m. Discontinue Adderall.  Start Ritalin 5 mg twice daily.  Possible side effects discussed.      Myles Rodgers M.D.     This note was created using voice recognition software (Dragon). The accuracy of the dictation is limited by the abilities of the software. I have reviewed the note prior to signing, however some errors in grammar and context are still possible. If you have any questions related to this note please do not hesitate to contact our office.

## 2022-10-05 ENCOUNTER — DOCUMENTATION (OUTPATIENT)
Dept: BEHAVIORAL HEALTH | Facility: CLINIC | Age: 27
End: 2022-10-05
Payer: COMMERCIAL

## 2022-10-25 ENCOUNTER — APPOINTMENT (OUTPATIENT)
Dept: BEHAVIORAL HEALTH | Facility: CLINIC | Age: 27
End: 2022-10-25
Payer: COMMERCIAL

## 2022-11-04 ENCOUNTER — HOSPITAL ENCOUNTER (OUTPATIENT)
Dept: LAB | Facility: MEDICAL CENTER | Age: 27
End: 2022-11-04
Attending: STUDENT IN AN ORGANIZED HEALTH CARE EDUCATION/TRAINING PROGRAM
Payer: COMMERCIAL

## 2022-11-04 ENCOUNTER — OFFICE VISIT (OUTPATIENT)
Dept: MEDICAL GROUP | Facility: MEDICAL CENTER | Age: 27
End: 2022-11-04
Payer: COMMERCIAL

## 2022-11-04 VITALS
HEIGHT: 69 IN | SYSTOLIC BLOOD PRESSURE: 118 MMHG | WEIGHT: 154.32 LBS | BODY MASS INDEX: 22.86 KG/M2 | TEMPERATURE: 97.4 F | DIASTOLIC BLOOD PRESSURE: 60 MMHG | HEART RATE: 74 BPM | OXYGEN SATURATION: 97 % | RESPIRATION RATE: 16 BRPM

## 2022-11-04 DIAGNOSIS — F33.1 MAJOR DEPRESSIVE DISORDER, RECURRENT EPISODE, MODERATE (HCC): ICD-10-CM

## 2022-11-04 DIAGNOSIS — F90.0 ATTENTION DEFICIT HYPERACTIVITY DISORDER, INATTENTIVE TYPE: ICD-10-CM

## 2022-11-04 DIAGNOSIS — Z11.3 SCREEN FOR STD (SEXUALLY TRANSMITTED DISEASE): ICD-10-CM

## 2022-11-04 DIAGNOSIS — F41.1 GENERALIZED ANXIETY DISORDER: ICD-10-CM

## 2022-11-04 PROCEDURE — 99214 OFFICE O/P EST MOD 30 MIN: CPT | Performed by: STUDENT IN AN ORGANIZED HEALTH CARE EDUCATION/TRAINING PROGRAM

## 2022-11-04 PROCEDURE — 86780 TREPONEMA PALLIDUM: CPT

## 2022-11-04 PROCEDURE — 36415 COLL VENOUS BLD VENIPUNCTURE: CPT

## 2022-11-04 PROCEDURE — 87591 N.GONORRHOEAE DNA AMP PROB: CPT

## 2022-11-04 PROCEDURE — 86694 HERPES SIMPLEX NES ANTBDY: CPT

## 2022-11-04 PROCEDURE — 87491 CHLMYD TRACH DNA AMP PROBE: CPT

## 2022-11-04 RX ORDER — METHYLPHENIDATE HYDROCHLORIDE 5 MG/1
5 TABLET ORAL 2 TIMES DAILY
Qty: 60 TABLET | Refills: 0 | Status: SHIPPED | OUTPATIENT
Start: 2023-01-05 | End: 2023-02-04

## 2022-11-04 RX ORDER — METHYLPHENIDATE HYDROCHLORIDE 5 MG/1
5 TABLET ORAL 2 TIMES DAILY
Qty: 60 TABLET | Refills: 0 | Status: SHIPPED | OUTPATIENT
Start: 2022-11-04 | End: 2022-12-04

## 2022-11-04 RX ORDER — METHYLPHENIDATE HYDROCHLORIDE 5 MG/1
5 TABLET ORAL 2 TIMES DAILY
Qty: 60 TABLET | Refills: 0 | Status: SHIPPED | OUTPATIENT
Start: 2022-12-05 | End: 2022-12-20 | Stop reason: SDUPTHER

## 2022-11-04 ASSESSMENT — FIBROSIS 4 INDEX: FIB4 SCORE: 0.77

## 2022-11-04 NOTE — PROGRESS NOTES
Subjective:     CC: follow up ADHD and other issues    HPI:   Kenzie presents today for follow up ADHD and other issues    Problem   Screen for Std (Sexually Transmitted Disease)    He is requesting STD screening. He has a new female partner. Denies current symptoms. History of chlamydia in the past around 2019 which was treated.     Major Depressive Disorder, Recurrent Episode, Moderate (Hcc)    Chronic condition. He has been taking sertraline 50mg qAM and hydroxyzine 50mg nightly for sleep. He reports 70% improvement in his mood and would like to trial a higher dose for sertraline. He does take hydroxyzine nightly as needed which helps with his sleep.    His main concern is that he sometimes slurs while talking when he is anxious and he is hoping to improve that so he can handle advancement in work positions. Has tried Zoloft and Lexapro in the past for 4-5 months and stopped due to feeling sleepy. His previous provider in [place] had him try lorazepam 0.5mg as needed which helped with sleep but he felt it was too strong. He knows he has a good life but will sometimes   Current regimen: none  He reports compliance and/or tolerance and symptoms controlled with current medication(s). Does not need medication(s) refill. No acute concerns.     Generalized Anxiety Disorder    Chronic condition. He has been taking sertraline 50mg qAM and hydroxyzine 50mg nightly for sleep. He reports 70% improvement in his mood and would like to trial a higher dose for sertraline. He does take hydroxyzine nightly as needed which helps with his sleep.    His main concern is that he sometimes slurs while talking when he is anxious and he is hoping to improve that so he can handle advancement in work positions. Has tried Zoloft and Lexapro in the past for 4-5 months and stopped due to feeling sleepy. His previous provider in [place] had him try lorazepam 0.5mg as needed which helped with sleep but he felt it was too strong. He knows he  has a good life but will sometimes   Current regimen: none  He reports compliance and/or tolerance and symptoms controlled with current medication(s). Does not need medication(s) refill. No acute concerns.     Attention Deficit Hyperactivity Disorder, Inattentive Type    Chronic condition since high school. Since last visit, he has seen psychiatry and switched to Ritalin 5mg BID and has been tolerating well. He feels Ritalin has been working better than Adderall as it does not give him the stomach discomfort. His next appointment with psychiatry is scheduled for 12/20/2022.    He used to take Adderall (also tried Strattera in the past) but discontinued due to GI discomfort. The last time he took medications for ADHD was 1 year ago.    He has been having increased inattention due to work. He would like to restart Adderall 5mg qAM.         Current Outpatient Medications Ordered in Epic   Medication Sig Dispense Refill    methylphenidate (RITALIN) 5 MG Tab Take 1 Tablet by mouth 2 times a day for 30 days. 60 Tablet 0    [START ON 12/5/2022] methylphenidate (RITALIN) 5 MG Tab Take 1 Tablet by mouth 2 times a day for 30 days. 60 Tablet 0    [START ON 1/5/2023] methylphenidate (RITALIN) 5 MG Tab Take 1 Tablet by mouth 2 times a day for 30 days. 60 Tablet 0    sertraline (ZOLOFT) 50 MG Tab Take 1.5 Tablets by mouth every morning for 90 days. 135 Tablet 0    minoxidil (LONITEN) 2.5 MG Tab Take 0.5 Tablets by mouth every day for 90 days. 45 Tablet 3    hydrOXYzine HCl (ATARAX) 50 MG Tab Take 1 Tablet by mouth every evening. 90 Tablet 3    albuterol 108 (90 Base) MCG/ACT Aero Soln inhalation aerosol Inhale 1-2 Puffs every four hours as needed for Shortness of Breath. 1 Each 3     No current Epic-ordered facility-administered medications on file.     Social history  Living situation: lives by self at home, he was last in [place] and moved to [place] 07/2021, originally from [place]  Occupation: works as  at  "[employer]  Alcohol/tobacco/illicit drugs: never smoker, social EtOH, denies illicit drugs  Diet/Exercise: tries to eat healthy in general, regular exercise 4 times a week with weight lifting     Health Maintenance: reviewed and discussed with patient  Vaccines: Tdap (received 04/2021 in [place]), HPV received in [place], Moderna COVID #2 received 08/2021 (no plans for booster)     ROS:   Gen: no fevers/chills, no changes in weight  Pulm: no sob, no cough  CV: no chest pain, no palpitations  GI: no nausea/vomiting, no diarrhea  : no dysuria  Neuro: no headaches  Psych: + anxiety/depression, ADHD    Objective:     Exam:  /60 (BP Location: Left arm, Patient Position: Sitting, BP Cuff Size: Adult)   Pulse 74   Temp 36.3 °C (97.4 °F) (Temporal)   Resp 16   Ht 1.753 m (5' 9\")   Wt 70 kg (154 lb 5.2 oz)   SpO2 97%   BMI 22.79 kg/m²  Body mass index is 22.79 kg/m².    General: Normal appearing. No distress.  Pulmonary: Clear to ausculation. Normal effort. No rales, ronchi, or wheezing.  Cardiovascular: Regular rate and rhythm without murmur.  Abdomen: Soft, nontender, nondistended. Normal bowel sounds.  Musculoskeletal: Normal gait. No extremity cyanosis, clubbing, or edema.  Psych: Normal mood and affect. Alert and oriented x3. Judgment and insight is normal.    Labs: no new lab results since last visit    Assessment & Plan:     27 y.o. male with the following -     1. Attention deficit hyperactivity disorder, inattentive type  Chronic condition, stable with Ritalin. Now followed by psychiatry.  - cont current regimen: Ritalin 5mg BID, PDMP reviewed and appropriate, Rx refilled for 90 days, UDS last done 05/2022  - cont scheduled follow up with psychiatry  - methylphenidate (RITALIN) 5 MG Tab; Take 1 Tablet by mouth 2 times a day for 30 days.  Dispense: 60 Tablet; Refill: 0  - methylphenidate (RITALIN) 5 MG Tab; Take 1 Tablet by mouth 2 times a day for 30 days.  Dispense: 60 Tablet; Refill: 0  - " methylphenidate (RITALIN) 5 MG Tab; Take 1 Tablet by mouth 2 times a day for 30 days.  Dispense: 60 Tablet; Refill: 0    2. Major depressive disorder, recurrent episode, moderate (HCC)  Chronic condition, sub-optimal.  - trial increase sertraline to 75mg daily  - sertraline (ZOLOFT) 50 MG Tab; Take 1.5 Tablets by mouth every morning for 90 days.  Dispense: 135 Tablet; Refill: 0    3. Generalized anxiety disorder  Chronic condition, sub-optimal.  - trial increase sertraline to 75mg daily  - sertraline (ZOLOFT) 50 MG Tab; Take 1.5 Tablets by mouth every morning for 90 days.  Dispense: 135 Tablet; Refill: 0    4. Screen for STD (sexually transmitted disease)  - ANTIBODY,TREPONEMA PALLIDUM; Future  - HSV 1/2 IGG W/ TYPE SPECIFIC RFLX; Future  - Chlamydia/GC, PCR (Urine); Future    Return in about 3 months (around 2/4/2023) for chronic medical conditions.    Please note that this dictation was created using voice recognition software. I have made every reasonable attempt to correct obvious errors, but I expect that there are errors of grammar and possibly content that I did not discover before finalizing the note.

## 2022-11-05 LAB
C TRACH DNA SPEC QL NAA+PROBE: NEGATIVE
N GONORRHOEA DNA SPEC QL NAA+PROBE: NEGATIVE
SPECIMEN SOURCE: NORMAL

## 2022-11-07 LAB
HSV1+2 IGG SER IA-ACNC: 0.65 IV
T PALLIDUM AB SER QL AGGL: NON REACTIVE

## 2022-12-20 ENCOUNTER — TELEMEDICINE (OUTPATIENT)
Dept: BEHAVIORAL HEALTH | Facility: CLINIC | Age: 27
End: 2022-12-20
Payer: COMMERCIAL

## 2022-12-20 DIAGNOSIS — F90.0 ATTENTION DEFICIT HYPERACTIVITY DISORDER, INATTENTIVE TYPE: ICD-10-CM

## 2022-12-20 DIAGNOSIS — F41.1 GENERALIZED ANXIETY DISORDER: ICD-10-CM

## 2022-12-20 DIAGNOSIS — F33.1 MAJOR DEPRESSIVE DISORDER, RECURRENT EPISODE, MODERATE (HCC): ICD-10-CM

## 2022-12-20 PROCEDURE — 99214 OFFICE O/P EST MOD 30 MIN: CPT | Mod: 95 | Performed by: PSYCHIATRY & NEUROLOGY

## 2022-12-20 RX ORDER — METHYLPHENIDATE HYDROCHLORIDE 5 MG/1
5 TABLET ORAL 2 TIMES DAILY
Qty: 60 TABLET | Refills: 0 | Status: SHIPPED | OUTPATIENT
Start: 2022-12-20 | End: 2023-01-19
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
## 2023-02-03 ENCOUNTER — APPOINTMENT (OUTPATIENT)
Dept: MEDICAL GROUP | Facility: MEDICAL CENTER | Age: 28
End: 2023-02-03
Payer: COMMERCIAL

## 2023-03-03 ENCOUNTER — APPOINTMENT (OUTPATIENT)
Dept: MEDICAL GROUP | Facility: MEDICAL CENTER | Age: 28
End: 2023-03-03
Payer: COMMERCIAL

## 2023-03-13 RX ORDER — METHYLPHENIDATE HYDROCHLORIDE 5 MG/1
5 TABLET ORAL 2 TIMES DAILY
Qty: 60 TABLET | Refills: 0 | Status: CANCELLED | OUTPATIENT
Start: 2023-03-14 | End: 2023-04-13

## 2023-03-13 RX ORDER — METHYLPHENIDATE HYDROCHLORIDE 5 MG/1
5 TABLET ORAL 2 TIMES DAILY
Qty: 60 TABLET | Refills: 0 | Status: CANCELLED | OUTPATIENT
Start: 2023-04-14 | End: 2023-05-14

## 2023-03-13 RX ORDER — METHYLPHENIDATE HYDROCHLORIDE 5 MG/1
5 TABLET ORAL 2 TIMES DAILY
Qty: 60 TABLET | Refills: 0 | Status: CANCELLED | OUTPATIENT
Start: 2023-05-15 | End: 2023-06-14

## 2023-03-13 NOTE — PROGRESS NOTES
Subjective:     CC: med refill    HPI:   Kenzie presents today for med refill    Problem   Major Depressive Disorder, Recurrent Episode, Moderate (Hcc)    Chronic condition. Since last visit, his symptoms have been well controlled with current regimen.  Current regimen: sertraline 75mg daily    His main concern is that he sometimes slurs while talking when he is anxious and he is hoping to improve that so he can handle advancement in work positions. Has tried Zoloft and Lexapro in the past for 4-5 months and stopped due to feeling sleepy. His previous provider in [place] had him try lorazepam 0.5mg as needed which helped with sleep but he felt it was too strong. He knows he has a good life but will sometimes   Current regimen: none  He reports compliance and/or tolerance and symptoms controlled with current medication(s). Does not need medication(s) refill. No acute concerns.     Generalized Anxiety Disorder    Chronic condition. Since last visit, his symptoms have been well controlled with current regimen.  Current regimen: sertraline 75mg daily    His main concern is that he sometimes slurs while talking when he is anxious and he is hoping to improve that so he can handle advancement in work positions. Has tried Zoloft and Lexapro in the past for 4-5 months and stopped due to feeling sleepy. His previous provider in [place] had him try lorazepam 0.5mg as needed which helped with sleep but he felt it was too strong. He knows he has a good life but will sometimes   Current regimen: none  He reports compliance and/or tolerance and symptoms controlled with current medication(s). Does not need medication(s) refill. No acute concerns.     Attention Deficit Hyperactivity Disorder, Inattentive Type    Chronic condition since high school. Since last visit, he has not been taking Ritalin as symptoms improved with sertraline. No acute concerns at this time.    He used to take Adderall (also tried Strattera in the past)  but discontinued due to GI discomfort. The last time he took medications for ADHD was 1 year ago.    He has been having increased inattention due to work. He would like to restart Adderall 5mg qAM.     Anxiety    Chronic condition. Last visit, we restarted sertraline 50mg qAM and hydroxyzine 50mg nightly for sleep. He reports 70% improvement in his mood and would like to stay on current dose for sertraline. He does take hydroxyzine nightly as needed which helps with his sleep.    His main concern is that he sometimes slurs while talking when he is anxious and he is hoping to improve that so he can handle advancement in work positions. Has tried Zoloft and Lexapro in the past for 4-5 months and stopped due to feeling sleepy. His previous provider in [place] had him try lorazepam 0.5mg as needed which helped with sleep but he felt it was too strong. He knows he has a good life but will sometimes   Current regimen: none  He reports compliance and/or tolerance and symptoms controlled with current medication(s). Does not need medication(s) refill. No acute concerns.     Dizziness (Resolved)    Chronic condition. Last visit, we tried scopolamine patch. He reports improvement in symptoms with scopolamine patches. He has not been needing to use the patches recently as his anxiety improved.    He reports intermittent dizziness described as wobbling and shortness of breath usually when he is stressed. His symptoms are sometimes worsened by head movement. He drinks about 80cc a day. He wakes up slightly anxious. No known history of snoring. No recent blood work. Job is stressful.         Current Outpatient Medications Ordered in Epic   Medication Sig Dispense Refill    sertraline (ZOLOFT) 50 MG Tab Take 1.5 Tablets by mouth every day for 90 days. 135 Tablet 3     No current Epic-ordered facility-administered medications on file.     Social history  Living situation: lives by self at home, he was last in [place] and moved  "to [place] 07/2021, originally from [place]  Occupation: works as  at [employer]  Alcohol/tobacco/illicit drugs: never smoker, social EtOH, denies illicit drugs  Diet/Exercise: tries to eat healthy in general, regular exercise 4 times a week with weight lifting     Health Maintenance: reviewed and discussed with patient  Vaccines: Tdap (received 04/2021 in [place]), HPV received in [place], Moderna COVID #2 received 08/2021 (no plans for booster)     ROS:   Gen: no fevers/chills, no changes in weight  Pulm: no sob, no cough  CV: no chest pain, no palpitations  GI: no nausea/vomiting, no diarrhea  : no dysuria  Neuro: no headaches  Psych: + anxiety/depression, ADHD    Objective:     Exam:  /64 (BP Location: Left arm, Patient Position: Sitting, BP Cuff Size: Adult)   Pulse (!) 105   Temp 36.7 °C (98 °F) (Temporal)   Resp 16   Ht 1.753 m (5' 9\")   Wt 75 kg (165 lb 5.5 oz)   SpO2 95%   BMI 24.42 kg/m²  Body mass index is 24.42 kg/m².    General: Normal appearing. No distress.  Pulmonary: Clear to ausculation. Normal effort. No rales, ronchi, or wheezing.  Cardiovascular: Regular rate and rhythm without murmur.  Abdomen: Soft, nontender, nondistended. Normal bowel sounds.  Musculoskeletal: Normal gait. No extremity cyanosis, clubbing, or edema.  Psych: Normal mood and affect. Alert and oriented x3. Judgment and insight is normal.    Labs: No recent lab results available for review at this time    Assessment & Plan:     27 y.o. male with the following -     1. Generalized anxiety disorder  Chronic condition, stable.  - cont current regimen: sertraline 75mg daily  - sertraline (ZOLOFT) 50 MG Tab; Take 1.5 Tablets by mouth every day for 90 days.  Dispense: 135 Tablet; Refill: 3    2. Major depressive disorder, recurrent episode, moderate (HCC)  Chronic condition, stable.  - cont current regimen: sertraline 75mg daily  - sertraline (ZOLOFT) 50 MG Tab; Take 1.5 Tablets by mouth every day for 90 " days.  Dispense: 135 Tablet; Refill: 3    3. Attention deficit hyperactivity disorder, inattentive type  Chronic condition, stable. Symptoms resolved with sertraline.    Return in about 1 year (around 3/14/2024) for annual physical.    Please note that this dictation was created using voice recognition software. I have made every reasonable attempt to correct obvious errors, but I expect that there are errors of grammar and possibly content that I did not discover before finalizing the note.

## 2023-03-14 ENCOUNTER — OFFICE VISIT (OUTPATIENT)
Dept: MEDICAL GROUP | Facility: MEDICAL CENTER | Age: 28
End: 2023-03-14
Payer: COMMERCIAL

## 2023-03-14 VITALS
BODY MASS INDEX: 24.49 KG/M2 | SYSTOLIC BLOOD PRESSURE: 108 MMHG | OXYGEN SATURATION: 95 % | HEART RATE: 105 BPM | DIASTOLIC BLOOD PRESSURE: 64 MMHG | RESPIRATION RATE: 16 BRPM | TEMPERATURE: 98 F | HEIGHT: 69 IN | WEIGHT: 165.34 LBS

## 2023-03-14 DIAGNOSIS — F41.1 GENERALIZED ANXIETY DISORDER: ICD-10-CM

## 2023-03-14 DIAGNOSIS — F90.0 ATTENTION DEFICIT HYPERACTIVITY DISORDER, INATTENTIVE TYPE: ICD-10-CM

## 2023-03-14 DIAGNOSIS — F33.1 MAJOR DEPRESSIVE DISORDER, RECURRENT EPISODE, MODERATE (HCC): ICD-10-CM

## 2023-03-14 PROBLEM — R42 DIZZINESS: Status: RESOLVED | Noted: 2022-03-18 | Resolved: 2023-03-14

## 2023-03-14 PROCEDURE — 99214 OFFICE O/P EST MOD 30 MIN: CPT | Performed by: STUDENT IN AN ORGANIZED HEALTH CARE EDUCATION/TRAINING PROGRAM

## 2023-03-14 ASSESSMENT — FIBROSIS 4 INDEX: FIB4 SCORE: 0.77

## 2023-10-24 ENCOUNTER — DOCUMENTATION (OUTPATIENT)
Dept: HEALTH INFORMATION MANAGEMENT | Facility: OTHER | Age: 28
End: 2023-10-24
Payer: COMMERCIAL

## 2024-02-09 ENCOUNTER — TELEPHONE (OUTPATIENT)
Dept: HEALTH INFORMATION MANAGEMENT | Facility: OTHER | Age: 29
End: 2024-02-09
Payer: COMMERCIAL

## 2024-03-27 ENCOUNTER — OFFICE VISIT (OUTPATIENT)
Dept: MEDICAL GROUP | Facility: MEDICAL CENTER | Age: 29
End: 2024-03-27
Payer: COMMERCIAL

## 2024-03-27 VITALS
BODY MASS INDEX: 25.48 KG/M2 | RESPIRATION RATE: 16 BRPM | HEART RATE: 82 BPM | DIASTOLIC BLOOD PRESSURE: 66 MMHG | HEIGHT: 69 IN | WEIGHT: 172 LBS | SYSTOLIC BLOOD PRESSURE: 100 MMHG | OXYGEN SATURATION: 94 % | TEMPERATURE: 98.3 F

## 2024-03-27 DIAGNOSIS — K58.0 IRRITABLE BOWEL SYNDROME WITH DIARRHEA: ICD-10-CM

## 2024-03-27 DIAGNOSIS — F33.1 MAJOR DEPRESSIVE DISORDER, RECURRENT EPISODE, MODERATE (HCC): ICD-10-CM

## 2024-03-27 DIAGNOSIS — Z00.00 ANNUAL PHYSICAL EXAM: ICD-10-CM

## 2024-03-27 DIAGNOSIS — F41.0 PANIC ATTACKS: ICD-10-CM

## 2024-03-27 DIAGNOSIS — F41.9 ANXIETY: ICD-10-CM

## 2024-03-27 PROBLEM — F90.0 ADHD (ATTENTION DEFICIT HYPERACTIVITY DISORDER), INATTENTIVE TYPE: Status: ACTIVE | Noted: 2020-07-08

## 2024-03-27 PROBLEM — F41.1 GENERALIZED ANXIETY DISORDER: Status: RESOLVED | Noted: 2022-10-04 | Resolved: 2024-03-27

## 2024-03-27 PROBLEM — Z11.3 SCREEN FOR STD (SEXUALLY TRANSMITTED DISEASE): Status: RESOLVED | Noted: 2022-11-04 | Resolved: 2024-03-27

## 2024-03-27 RX ORDER — HYDROXYZINE HYDROCHLORIDE 10 MG/1
10 TABLET, FILM COATED ORAL 3 TIMES DAILY PRN
Qty: 60 TABLET | Refills: 0 | Status: SHIPPED | OUTPATIENT
Start: 2024-03-27

## 2024-03-27 RX ORDER — DICYCLOMINE HYDROCHLORIDE 10 MG/1
10 CAPSULE ORAL
Qty: 90 CAPSULE | Refills: 0 | Status: SHIPPED | OUTPATIENT
Start: 2024-03-27

## 2024-03-27 ASSESSMENT — ENCOUNTER SYMPTOMS
FEVER: 0
SORE THROAT: 0
FLANK PAIN: 0
SINUS PAIN: 0
BLOOD IN STOOL: 0
WEIGHT LOSS: 0
COUGH: 0
NAUSEA: 0
FOCAL WEAKNESS: 0
NERVOUS/ANXIOUS: 0
BACK PAIN: 0
DEPRESSION: 0
VOMITING: 0
ABDOMINAL PAIN: 0
BRUISES/BLEEDS EASILY: 0
DIARRHEA: 1
HEADACHES: 0
MYALGIAS: 0
CONSTIPATION: 0
WHEEZING: 0
CHILLS: 0
EYE PAIN: 0
WEAKNESS: 0
INSOMNIA: 0
EYE REDNESS: 0
DIZZINESS: 0
SPUTUM PRODUCTION: 0
TREMORS: 0
POLYDIPSIA: 0
PALPITATIONS: 0
LOSS OF CONSCIOUSNESS: 0
SPEECH CHANGE: 0
SHORTNESS OF BREATH: 0
SENSORY CHANGE: 0
EYE DISCHARGE: 0

## 2024-03-27 ASSESSMENT — PATIENT HEALTH QUESTIONNAIRE - PHQ9
CLINICAL INTERPRETATION OF PHQ2 SCORE: 0
4. FEELING TIRED OR HAVING LITTLE ENERGY: NOT AT ALL
SUM OF ALL RESPONSES TO PHQ QUESTIONS 1-9: 0
5. POOR APPETITE OR OVEREATING: NOT AT ALL
1. LITTLE INTEREST OR PLEASURE IN DOING THINGS: NOT AT ALL
7. TROUBLE CONCENTRATING ON THINGS, SUCH AS READING THE NEWSPAPER OR WATCHING TELEVISION: NOT AT ALL
6. FEELING BAD ABOUT YOURSELF - OR THAT YOU ARE A FAILURE OR HAVE LET YOURSELF OR YOUR FAMILY DOWN: NOT AL ALL
8. MOVING OR SPEAKING SO SLOWLY THAT OTHER PEOPLE COULD HAVE NOTICED. OR THE OPPOSITE, BEING SO FIGETY OR RESTLESS THAT YOU HAVE BEEN MOVING AROUND A LOT MORE THAN USUAL: NOT AT ALL
SUM OF ALL RESPONSES TO PHQ9 QUESTIONS 1 AND 2: 0
3. TROUBLE FALLING OR STAYING ASLEEP OR SLEEPING TOO MUCH: NOT AT ALL
2. FEELING DOWN, DEPRESSED, IRRITABLE, OR HOPELESS: NOT AT ALL
9. THOUGHTS THAT YOU WOULD BE BETTER OFF DEAD, OR OF HURTING YOURSELF: NOT AT ALL

## 2024-03-27 NOTE — PROGRESS NOTES
Patient agreed to using FEDE: yes    Kenzie was seen today for medication refill.    Diagnoses and all orders for this visit:    Annual physical exam  -     CBC WITHOUT DIFFERENTIAL; Future  -     Comp Metabolic Panel; Future  -     FREE THYROXINE; Future  -     Lipid Profile; Future  -     VITAMIN D,25 HYDROXY (DEFICIENCY); Future  -     TSH WITH REFLEX TO FT4; Future  -     HEMOGLOBIN A1C; Future    Irritable bowel syndrome with diarrhea  -     dicyclomine (BENTYL) 10 MG Cap; Take 1 Capsule by mouth 4 Times a Day,Before Meals and at Bedtime.    Anxiety  -     sertraline (ZOLOFT) 50 MG Tab; Take 1 Tablet by mouth every day. 1.5 tablets once a day  -     hydrOXYzine HCl (ATARAX) 10 MG Tab; Take 1 Tablet by mouth 3 times a day as needed for Anxiety.    Major depressive disorder, recurrent episode, moderate (HCC)  -     sertraline (ZOLOFT) 50 MG Tab; Take 1 Tablet by mouth every day. 1.5 tablets once a day    Panic attacks  -     hydrOXYzine HCl (ATARAX) 10 MG Tab; Take 1 Tablet by mouth 3 times a day as needed for Anxiety.              Assessment & Plan  1. Annual physical exam.  We will complete annual physical exam today.    2. IBS predominantly with diarrhea.  This is chronic and stable problem. Trial of Bentyl and the patient will continue to avoid known triggers.  Follow-up next visit    3. Anxiety.  4. Panic attacks   This is chronic and stable condition. He is on sertraline 50 mg, needs refills today. I will refill.  Trial of Atarax for breakthrough panic attacks, take as needed.  Will follow-up next visit.    5. Depressive disorder.  Chronic and stable.  This is well controlled on Zoloft. Refills given.    History of Present Illness  This is a pleasant 28-year-old male who is here to establish care and he needs a refill on Zoloft. He is taking that for anxiety and depression.    He denies any thoughts of life not worth living or suicidal thoughts. He denies any little interest in doing things. He denies  feeling down, depressed, or hopeless. He used to be stressed all the time, anxious all the time, and depressed, but being on Zoloft has greatly improved his mood and allowed him to cope with a lot of things. He still has occasional overwhelming spikes during the day. He has panic attacks.    He was diagnosed with ADHD growing up. He still has ADHD in mind, but Zoloft helps calm things down a little bit as well. Focus is still difficult sometimes.    He denies any concerns for STIs. He is a regular diet. He denies any blood in urine or stool.    His stomach is fragile. It depends on what he eats and stress levels. He is very sensitive on things that he eats. He tries to avoid fast food as much as possible. He has diarrhea 3 to 4 days out of the week.    Supplemental Information  He was taking medication for male pattern alopecia, but it messed with his blood sugar and he would feel drowsy.   He drinks alcohol 3 times a week, denies having issues with it. He is a .   His mother was diagnosed with breast cancer recently. His paternal great grandfather and a couple of his uncles had prostate cancer.     Health Maintenance  Below Anticipatory guidance discussed with patient  Cholesterol Screening: labs  Diabetes Screening: labs  Aspirin Use: no    Diet: regular   Exercise: daily   Substance Abuse: no   Safe in relationship.   Seat belts, bike helmet, gun safety discussed.  Sun protection used.    Infectious disease screening/Immunizations  --STI Screening: no concerns   --Practices safe sex.  --HIV Screening: labs   --Hepatitis C Screening: labs   --Immunizations:      Review of Systems   Constitutional:  Negative for chills, fever, malaise/fatigue and weight loss.   HENT:  Negative for congestion, ear discharge, hearing loss, sinus pain and sore throat.    Eyes:  Negative for pain, discharge and redness.   Respiratory:  Negative for cough, sputum production, shortness of breath and wheezing.     Cardiovascular:  Negative for chest pain, palpitations and leg swelling.   Gastrointestinal:  Positive for diarrhea. Negative for abdominal pain, blood in stool, constipation, nausea and vomiting.   Genitourinary:  Negative for dysuria, flank pain, frequency, hematuria and urgency.   Musculoskeletal:  Negative for back pain, joint pain and myalgias.   Skin:  Negative for itching and rash.   Neurological:  Negative for dizziness, tremors, sensory change, speech change, focal weakness, loss of consciousness, weakness and headaches.   Endo/Heme/Allergies:  Negative for environmental allergies and polydipsia. Does not bruise/bleed easily.   Psychiatric/Behavioral:  Negative for depression. The patient is not nervous/anxious and does not have insomnia.         He  has no past medical history on file.  He  has no past surgical history on file.  Family History   Problem Relation Age of Onset    Cancer Mother         br ca     Social History     Tobacco Use    Smoking status: Never    Smokeless tobacco: Never     Patient Active Problem List    Diagnosis Date Noted    Irritable bowel syndrome with diarrhea 03/27/2024    Panic attacks 03/27/2024    Major depressive disorder, recurrent episode, moderate (HCC) 10/04/2022    Male pattern alopecia 08/31/2022    Anxiety 03/18/2022    Family history of diabetes mellitus (DM) 03/18/2022    ADHD (attention deficit hyperactivity disorder), inattentive type 07/08/2020     Current Outpatient Medications   Medication Sig Dispense Refill    dicyclomine (BENTYL) 10 MG Cap Take 1 Capsule by mouth 4 Times a Day,Before Meals and at Bedtime. 90 Capsule 0    sertraline (ZOLOFT) 50 MG Tab Take 1 Tablet by mouth every day. 1.5 tablets once a day 90 Tablet 3    hydrOXYzine HCl (ATARAX) 10 MG Tab Take 1 Tablet by mouth 3 times a day as needed for Anxiety. 60 Tablet 0     No current facility-administered medications for this visit.    (including changes today)  Allergies: Patient has no known  "allergies.    /66 (BP Location: Left arm, Patient Position: Sitting, BP Cuff Size: Adult)   Pulse 82   Temp 36.8 °C (98.3 °F) (Temporal)   Resp 16   Ht 1.753 m (5' 9\")   Wt 78 kg (172 lb)   SpO2 94%      Physical Exam  Constitutional:       General: He is not in acute distress.     Appearance: Normal appearance. He is normal weight. He is not ill-appearing.   HENT:      Head: Normocephalic and atraumatic.      Right Ear: Tympanic membrane, ear canal and external ear normal. There is no impacted cerumen.      Left Ear: Tympanic membrane, ear canal and external ear normal. There is no impacted cerumen.      Nose: Nose normal. No congestion or rhinorrhea.      Mouth/Throat:      Mouth: Mucous membranes are moist.      Pharynx: No oropharyngeal exudate or posterior oropharyngeal erythema.   Eyes:      General:         Right eye: No discharge.         Left eye: No discharge.      Pupils: Pupils are equal, round, and reactive to light.   Cardiovascular:      Rate and Rhythm: Normal rate.      Pulses: Normal pulses.      Heart sounds: Normal heart sounds. No murmur heard.     No friction rub. No gallop.   Pulmonary:      Effort: Pulmonary effort is normal. No respiratory distress.      Breath sounds: Normal breath sounds. No wheezing, rhonchi or rales.   Abdominal:      General: Bowel sounds are normal. There is no distension.      Palpations: Abdomen is soft. There is no mass.      Tenderness: There is no abdominal tenderness. There is no right CVA tenderness, left CVA tenderness, guarding or rebound.      Hernia: No hernia is present.   Musculoskeletal:         General: No swelling, tenderness or deformity. Normal range of motion.      Cervical back: Normal range of motion and neck supple.      Right lower leg: No edema.      Left lower leg: No edema.   Lymphadenopathy:      Cervical: No cervical adenopathy.   Skin:     General: Skin is warm.      Findings: No rash.   Neurological:      General: No focal " deficit present.      Mental Status: He is alert. Mental status is at baseline.      Cranial Nerves: No cranial nerve deficit.      Sensory: No sensory deficit.      Motor: No weakness.      Coordination: Coordination normal.      Gait: Gait normal.   Psychiatric:         Mood and Affect: Mood normal.         Behavior: Behavior normal.          Results         No follow-ups on file. 4-6 wks

## 2024-05-16 ENCOUNTER — APPOINTMENT (OUTPATIENT)
Dept: MEDICAL GROUP | Facility: MEDICAL CENTER | Age: 29
End: 2024-05-16
Payer: COMMERCIAL

## 2024-11-11 DIAGNOSIS — F33.1 MAJOR DEPRESSIVE DISORDER, RECURRENT EPISODE, MODERATE (HCC): ICD-10-CM

## 2024-11-11 DIAGNOSIS — F41.9 ANXIETY: ICD-10-CM

## 2024-11-11 NOTE — TELEPHONE ENCOUNTER
Received request via: Pharmacy    Was the patient seen in the last year in this department? Yes    Does the patient have an active prescription (recently filled or refills available) for medication(s) requested? No    Pharmacy Name: [id number]    Does the patient have custodial Plus and need 100-day supply? (This applies to ALL medications) Patient does not have SCP